# Patient Record
Sex: MALE | Race: WHITE | NOT HISPANIC OR LATINO | Employment: OTHER | URBAN - METROPOLITAN AREA
[De-identification: names, ages, dates, MRNs, and addresses within clinical notes are randomized per-mention and may not be internally consistent; named-entity substitution may affect disease eponyms.]

---

## 2018-03-10 ENCOUNTER — OFFICE VISIT (OUTPATIENT)
Dept: URGENT CARE | Facility: CLINIC | Age: 73
End: 2018-03-10
Payer: MEDICARE

## 2018-03-10 VITALS
DIASTOLIC BLOOD PRESSURE: 83 MMHG | HEIGHT: 70 IN | OXYGEN SATURATION: 97 % | SYSTOLIC BLOOD PRESSURE: 151 MMHG | HEART RATE: 78 BPM | TEMPERATURE: 99 F | RESPIRATION RATE: 16 BRPM | BODY MASS INDEX: 38.65 KG/M2 | WEIGHT: 270 LBS

## 2018-03-10 DIAGNOSIS — J30.2 ACUTE SEASONAL ALLERGIC RHINITIS, UNSPECIFIED TRIGGER: Primary | ICD-10-CM

## 2018-03-10 PROCEDURE — 99202 OFFICE O/P NEW SF 15 MIN: CPT | Mod: 25,S$GLB,, | Performed by: FAMILY MEDICINE

## 2018-03-10 PROCEDURE — 96372 THER/PROPH/DIAG INJ SC/IM: CPT | Mod: S$GLB,,, | Performed by: FAMILY MEDICINE

## 2018-03-10 RX ORDER — AZITHROMYCIN 250 MG/1
TABLET, FILM COATED ORAL
Qty: 6 TABLET | Refills: 0 | Status: SHIPPED | OUTPATIENT
Start: 2018-03-10 | End: 2018-03-15

## 2018-03-10 RX ORDER — BETAMETHASONE SODIUM PHOSPHATE AND BETAMETHASONE ACETATE 3; 3 MG/ML; MG/ML
9 INJECTION, SUSPENSION INTRA-ARTICULAR; INTRALESIONAL; INTRAMUSCULAR; SOFT TISSUE
Status: COMPLETED | OUTPATIENT
Start: 2018-03-10 | End: 2018-03-10

## 2018-03-10 RX ADMIN — BETAMETHASONE SODIUM PHOSPHATE AND BETAMETHASONE ACETATE 9 MG: 3; 3 INJECTION, SUSPENSION INTRA-ARTICULAR; INTRALESIONAL; INTRAMUSCULAR; SOFT TISSUE at 05:03

## 2018-03-10 NOTE — PROGRESS NOTES
"Subjective:       Patient ID: Ramirez Cam is a 73 y.o. male.    Vitals:  height is 5' 10" (1.778 m) and weight is 122.5 kg (270 lb). His oral temperature is 98.5 °F (36.9 °C). His blood pressure is 151/83 (abnormal) and his pulse is 78. His respiration is 16 and oxygen saturation is 97%.     Chief Complaint: Nasal Congestion; Sinus Problem; and Cough    Sinus Problem   This is a new problem. The current episode started in the past 7 days. The problem has been gradually worsening since onset. There has been no fever. He is experiencing no pain. Associated symptoms include congestion and coughing. Pertinent negatives include no chills, ear pain, headaches, hoarse voice, shortness of breath or sore throat. Past treatments include nothing. The treatment provided no relief.   Cough   This is a new problem. The current episode started in the past 7 days. The problem has been gradually worsening. The problem occurs every few minutes. The cough is productive of sputum. Pertinent negatives include no chest pain, chills, ear pain, eye redness, fever, headaches, myalgias, sore throat, shortness of breath or wheezing. Nothing aggravates the symptoms. He has tried nothing for the symptoms. The treatment provided no relief.     Review of Systems   Constitution: Negative for chills, fever and malaise/fatigue.   HENT: Positive for congestion. Negative for ear pain, hoarse voice and sore throat.    Eyes: Negative for discharge and redness.   Cardiovascular: Negative for chest pain, dyspnea on exertion and leg swelling.   Respiratory: Positive for cough. Negative for shortness of breath, sputum production and wheezing.    Musculoskeletal: Negative for myalgias.   Gastrointestinal: Negative for abdominal pain and nausea.   Neurological: Negative for headaches.       Objective:      Physical Exam   Constitutional: He appears well-developed and well-nourished.   HENT:   Head: Normocephalic and atraumatic.   Mouth/Throat: Oropharynx is " clear and moist. No oropharyngeal exudate.   Eyes: EOM are normal. Pupils are equal, round, and reactive to light.   Neck: Normal range of motion. Neck supple.   Cardiovascular: Normal rate, regular rhythm and normal heart sounds.    Pulmonary/Chest: Effort normal. He has wheezes (scant exp).   Nursing note and vitals reviewed.      Assessment:       1. Acute seasonal allergic rhinitis, unspecified trigger        Plan:         Acute seasonal allergic rhinitis, unspecified trigger  -     POCT Influenza A/B  -     betamethasone acetate-betamethasone sodium phosphate injection 9 mg; Inject 1.5 mLs (9 mg total) into the muscle one time.    Other orders  -     azithromycin (Z-NINA) 250 MG tablet; Take 2 tablets by mouth on day 1; Take 1 tablet by mouth on days 2-5  Dispense: 6 tablet; Refill: 0      To start antibiotics should sputum production become worse and feeling of shortness of breath

## 2018-03-10 NOTE — PATIENT INSTRUCTIONS

## 2021-02-21 ENCOUNTER — OFFICE VISIT (OUTPATIENT)
Dept: URGENT CARE | Facility: CLINIC | Age: 76
End: 2021-02-21
Payer: MEDICARE

## 2021-02-21 VITALS
OXYGEN SATURATION: 97 % | BODY MASS INDEX: 38.65 KG/M2 | SYSTOLIC BLOOD PRESSURE: 179 MMHG | HEIGHT: 70 IN | DIASTOLIC BLOOD PRESSURE: 93 MMHG | RESPIRATION RATE: 16 BRPM | TEMPERATURE: 99 F | WEIGHT: 270 LBS | HEART RATE: 61 BPM

## 2021-02-21 DIAGNOSIS — M54.41 ACUTE RIGHT-SIDED LOW BACK PAIN WITH RIGHT-SIDED SCIATICA: Primary | ICD-10-CM

## 2021-02-21 DIAGNOSIS — R81 GLUCOSURIA: ICD-10-CM

## 2021-02-21 LAB
BILIRUB UR QL STRIP: NEGATIVE
GLUCOSE SERPL-MCNC: 169 MG/DL (ref 70–110)
GLUCOSE UR QL STRIP: POSITIVE
KETONES UR QL STRIP: NEGATIVE
LEUKOCYTE ESTERASE UR QL STRIP: NEGATIVE
PH, POC UA: 5 (ref 5–8)
POC BLOOD, URINE: NEGATIVE
POC NITRATES, URINE: NEGATIVE
PROT UR QL STRIP: NEGATIVE
SP GR UR STRIP: 1.01 (ref 1–1.03)
UROBILINOGEN UR STRIP-ACNC: NORMAL (ref 0.3–2.2)

## 2021-02-21 PROCEDURE — 82962 GLUCOSE BLOOD TEST: CPT | Mod: S$GLB,,, | Performed by: PHYSICIAN ASSISTANT

## 2021-02-21 PROCEDURE — 99214 OFFICE O/P EST MOD 30 MIN: CPT | Mod: 25,S$GLB,, | Performed by: PHYSICIAN ASSISTANT

## 2021-02-21 PROCEDURE — 81003 URINALYSIS AUTO W/O SCOPE: CPT | Mod: QW,S$GLB,, | Performed by: PHYSICIAN ASSISTANT

## 2021-02-21 PROCEDURE — 81003 POCT URINALYSIS, DIPSTICK, AUTOMATED, W/O SCOPE: ICD-10-PCS | Mod: QW,S$GLB,, | Performed by: PHYSICIAN ASSISTANT

## 2021-02-21 PROCEDURE — 82962 POCT GLUCOSE, HAND-HELD DEVICE: ICD-10-PCS | Mod: S$GLB,,, | Performed by: PHYSICIAN ASSISTANT

## 2021-02-21 PROCEDURE — 99214 PR OFFICE/OUTPT VISIT, EST, LEVL IV, 30-39 MIN: ICD-10-PCS | Mod: 25,S$GLB,, | Performed by: PHYSICIAN ASSISTANT

## 2021-02-21 RX ORDER — METFORMIN HYDROCHLORIDE 1000 MG/1
TABLET ORAL
COMMUNITY
Start: 2020-12-23

## 2021-02-21 RX ORDER — WARFARIN 2.5 MG/1
TABLET ORAL
COMMUNITY
Start: 2021-02-04

## 2021-02-21 RX ORDER — INDAPAMIDE 1.25 MG/1
1.25 TABLET ORAL DAILY
COMMUNITY
Start: 2020-12-11

## 2021-02-21 RX ORDER — ALLOPURINOL 300 MG/1
300 TABLET ORAL DAILY
COMMUNITY
Start: 2020-12-20

## 2021-02-21 RX ORDER — FOLIC ACID 1 MG/1
1000 TABLET ORAL DAILY
COMMUNITY
Start: 2021-02-18

## 2021-02-21 RX ORDER — LIDOCAINE 50 MG/G
1 PATCH TOPICAL DAILY PRN
Qty: 30 PATCH | Refills: 0 | Status: SHIPPED | OUTPATIENT
Start: 2021-02-21

## 2021-02-21 RX ORDER — MESALAMINE 1.2 G/1
TABLET, DELAYED RELEASE ORAL
COMMUNITY
Start: 2020-08-31

## 2021-02-21 RX ORDER — TIMOLOL MALEATE 2.5 MG/ML
SOLUTION/ DROPS OPHTHALMIC
COMMUNITY
Start: 2021-02-19

## 2021-02-21 RX ORDER — LATANOPROST 50 UG/ML
SOLUTION/ DROPS OPHTHALMIC
COMMUNITY
Start: 2020-12-11

## 2021-02-21 RX ORDER — GABAPENTIN 400 MG/1
CAPSULE ORAL
COMMUNITY
Start: 2020-08-31

## 2021-02-21 RX ORDER — LOSARTAN POTASSIUM 100 MG/1
TABLET ORAL
COMMUNITY
Start: 2021-01-28

## 2021-02-21 RX ORDER — ROSUVASTATIN CALCIUM 10 MG/1
TABLET, COATED ORAL
COMMUNITY
Start: 2020-08-31

## 2021-02-21 RX ORDER — CARVEDILOL 6.25 MG/1
TABLET ORAL
COMMUNITY
Start: 2021-01-28

## 2021-02-21 RX ORDER — MONTELUKAST SODIUM 4 MG/1
TABLET, CHEWABLE ORAL
COMMUNITY
Start: 2020-08-31

## 2021-02-24 LAB
BACTERIA UR CULT: NO GROWTH
BACTERIA UR CULT: NORMAL

## 2021-02-26 ENCOUNTER — TELEPHONE (OUTPATIENT)
Dept: URGENT CARE | Facility: CLINIC | Age: 76
End: 2021-02-26

## 2024-09-20 ENCOUNTER — OFFICE VISIT (OUTPATIENT)
Dept: WOUND CARE | Facility: HOSPITAL | Age: 79
End: 2024-09-20
Attending: NURSE PRACTITIONER
Payer: MEDICARE

## 2024-09-20 VITALS
RESPIRATION RATE: 17 BRPM | DIASTOLIC BLOOD PRESSURE: 87 MMHG | SYSTOLIC BLOOD PRESSURE: 156 MMHG | HEART RATE: 77 BPM | TEMPERATURE: 99 F

## 2024-09-20 DIAGNOSIS — T81.33XA DISRUPTION OF TRAUMATIC INJURY WOUND REPAIR, INITIAL ENCOUNTER: Primary | ICD-10-CM

## 2024-09-20 PROCEDURE — 11042 DBRDMT SUBQ TIS 1ST 20SQCM/<: CPT

## 2024-09-20 PROCEDURE — 99214 OFFICE O/P EST MOD 30 MIN: CPT

## 2024-09-20 NOTE — PROGRESS NOTES
[facility]  Wound Care  Progress Note    Problem List Items Addressed This Visit          Orthopedic    Disruption of traumatic injury wound repair - Primary    Overview     HPI: 79 yr old male with history of laceration to right leg requiring 17 sutures, lower portion dehisced  and was referred to the wound clinic, he initially went to an Urgent care and was prescribed bactroban.    Location: right anterior lower leg    Duration: 8-11-24    Context: dehiscence of traumatic laceration     Associated Signs & Symptoms: denies pain    Timing: n/a    Severity: n/a    Quality: n/a    Modifying Factors: n/a            Physical Exam  Constitutional:       Appearance: Normal appearance.   HENT:      Head: Normocephalic.   Pulmonary:      Effort: Pulmonary effort is normal.   Musculoskeletal:         General: Normal range of motion.   Skin:     General: Skin is warm and dry.      Comments: Laceration to right lower leg that dehisced with adipose exposed.   Neurological:      Mental Status: He is alert and oriented to person, place, and time.   Psychiatric:         Mood and Affect: Mood normal.         Behavior: Behavior normal.         Thought Content: Thought content normal.         Judgment: Judgment normal.     Wound debrided, will use silver alginate qod, keep clean and dry and covered at all times, spandagrip stocking for edema management, will recheck in one week, his potential to heal is good.  See wound doc progress notes. Documents will be scanned.        Matilde King  [facility]  Progress Note    Problem List Items Addressed This Visit          Orthopedic    Disruption of traumatic injury wound repair - Primary    Overview     HPI: 79 yr old male with history of laceration to right leg requiring 17 sutures, lower portion dehisced  and was referred to the wound clinic, he initially went to an Urgent care and was prescribed  bactroban.    Location: right anterior lower leg    Duration: 8-11-24    Context: dehiscence of traumatic laceration     Associated Signs & Symptoms: denies pain    Timing: n/a    Severity: n/a    Quality: n/a    Modifying Factors: n/a              See wound doc progress notes. Documents will be scanned.        [facility]ilde DicksonBoeuf [facility]

## 2024-09-27 ENCOUNTER — OFFICE VISIT (OUTPATIENT)
Dept: WOUND CARE | Facility: HOSPITAL | Age: 79
End: 2024-09-27
Attending: NURSE PRACTITIONER
Payer: MEDICARE

## 2024-09-27 VITALS
DIASTOLIC BLOOD PRESSURE: 83 MMHG | SYSTOLIC BLOOD PRESSURE: 141 MMHG | TEMPERATURE: 99 F | RESPIRATION RATE: 18 BRPM | HEART RATE: 74 BPM

## 2024-09-27 DIAGNOSIS — T81.33XA DISRUPTION OF TRAUMATIC INJURY WOUND REPAIR, INITIAL ENCOUNTER: Primary | ICD-10-CM

## 2024-09-27 PROCEDURE — 11042 DBRDMT SUBQ TIS 1ST 20SQCM/<: CPT

## 2024-09-27 NOTE — PROGRESS NOTES
[facility]  Wound Care  Progress Note    Problem List Items Addressed This Visit          Orthopedic    Disruption of traumatic injury wound repair - Primary    Overview     HPI: 79 yr old male with history of laceration to right leg requiring 17 sutures, lower portion dehisced  and was referred to the wound clinic, he initially went to an Urgent care and was prescribed bactroban.    Location: right anterior lower leg    Duration: 8-11-24    Context: dehiscence of traumatic laceration     Associated Signs & Symptoms: denies pain    Timing: n/a    Severity: n/a    Quality: n/a    Modifying Factors: n/a    9-27-24: here for wound care for laceration to right leg          Physical Exam  Vitals reviewed.   Constitutional:       Appearance: Normal appearance.   HENT:      Head: Normocephalic.   Pulmonary:      Effort: Pulmonary effort is normal.   Musculoskeletal:         General: Normal range of motion.   Skin:     General: Skin is warm and dry.      Comments: Laceration to right lower leg that dehisced with adipose exposed.    Neurological:      General: No focal deficit present.      Mental Status: He is alert and oriented to person, place, and time.   Psychiatric:         Mood and Affect: Mood normal.         Behavior: Behavior normal.         Thought Content: Thought content normal.         Judgment: Judgment normal.     Wound debrided, will use silver alginate qod, keep clean and dry and covered at all times, spandagrip stocking for edema management, will recheck in one week, his potential to heal is good.   See wound doc progress notes. Documents will be scanned.        Matilde King  [facility]

## 2024-10-04 ENCOUNTER — OFFICE VISIT (OUTPATIENT)
Dept: WOUND CARE | Facility: HOSPITAL | Age: 79
End: 2024-10-04
Attending: NURSE PRACTITIONER
Payer: MEDICARE

## 2024-10-04 VITALS
DIASTOLIC BLOOD PRESSURE: 76 MMHG | HEART RATE: 76 BPM | SYSTOLIC BLOOD PRESSURE: 132 MMHG | TEMPERATURE: 98 F | RESPIRATION RATE: 18 BRPM

## 2024-10-04 DIAGNOSIS — T81.33XA DISRUPTION OF TRAUMATIC INJURY WOUND REPAIR, INITIAL ENCOUNTER: Primary | ICD-10-CM

## 2024-10-04 PROCEDURE — 11042 DBRDMT SUBQ TIS 1ST 20SQCM/<: CPT

## 2024-10-04 NOTE — PROGRESS NOTES
[facility]  Wound Care  Progress Note    Problem List Items Addressed This Visit          Orthopedic    Disruption of traumatic injury wound repair - Primary    Overview     HPI: 79 yr old male with history of laceration to right leg requiring 17 sutures, lower portion dehisced  and was referred to the wound clinic, he initially went to an Urgent care and was prescribed bactroban.    Location: right anterior lower leg    Duration: 8-11-24    Context: dehiscence of traumatic laceration     Associated Signs & Symptoms: denies pain    Timing: n/a    Severity: n/a    Quality: n/a    Modifying Factors: n/a    9-27-24: here for wound care for laceration to right leg        Physical Exam  Vitals reviewed.   Constitutional:       Appearance: Normal appearance.   Pulmonary:      Effort: Pulmonary effort is normal.   Musculoskeletal:         General: Normal range of motion.   Skin:     General: Skin is warm and dry.      Comments: Laceration to right lower leg that dehisced with adipose exposed   Neurological:      Mental Status: He is alert and oriented to person, place, and time.   Psychiatric:         Mood and Affect: Mood normal.         Behavior: Behavior normal.         Thought Content: Thought content normal.         Judgment: Judgment normal.     Wound debrided, will use silver alginate qod, keep clean and dry and covered at all times, spandagrip stocking for edema management, will recheck in one week, his potential to heal is good.       See wound doc progress notes. Documents will be scanned.        Matilde King  [facility]

## 2024-10-18 ENCOUNTER — OFFICE VISIT (OUTPATIENT)
Dept: WOUND CARE | Facility: HOSPITAL | Age: 79
End: 2024-10-18
Attending: NURSE PRACTITIONER
Payer: MEDICARE

## 2024-10-18 VITALS
DIASTOLIC BLOOD PRESSURE: 78 MMHG | SYSTOLIC BLOOD PRESSURE: 126 MMHG | RESPIRATION RATE: 18 BRPM | HEART RATE: 73 BPM | TEMPERATURE: 98 F

## 2024-10-18 DIAGNOSIS — T81.33XA DISRUPTION OF TRAUMATIC INJURY WOUND REPAIR, INITIAL ENCOUNTER: Primary | ICD-10-CM

## 2024-10-18 PROCEDURE — 11042 DBRDMT SUBQ TIS 1ST 20SQCM/<: CPT

## 2024-10-18 NOTE — PROGRESS NOTES
[facility]  Wound Care  Progress Note    Problem List Items Addressed This Visit          Orthopedic    Disruption of traumatic injury wound repair - Primary    Overview     HPI: 79 yr old male with history of laceration to right leg requiring 17 sutures, lower portion dehisced  and was referred to the wound clinic, he initially went to an Urgent care and was prescribed bactroban.    Location: right anterior lower leg    Duration: 8-11-24    Context: dehiscence of traumatic laceration     Associated Signs & Symptoms: denies pain    Timing: n/a    Severity: n/a    Quality: n/a    Modifying Factors: n/a    9-27-24: here for wound care for laceration to right leg          Physical Exam  Constitutional:       Appearance: Normal appearance.   HENT:      Head: Normocephalic.   Pulmonary:      Effort: Pulmonary effort is normal.   Musculoskeletal:         General: Normal range of motion.   Skin:     General: Skin is warm.      Comments: Laceration to right lower leg that dehisced with adipose exposed    Neurological:      Mental Status: He is alert and oriented to person, place, and time.   Psychiatric:         Mood and Affect: Mood normal.         Behavior: Behavior normal.         Thought Content: Thought content normal.         Judgment: Judgment normal.     Wound debrided, doing well and much smaller in size. will use silver alginate qod, keep clean and dry and covered at all times, spandagrip stocking for edema management, will recheck in one week, his potential to heal is good.     See wound doc progress notes. Documents will be scanned.        Matilde King  [facility]

## 2024-10-25 ENCOUNTER — OFFICE VISIT (OUTPATIENT)
Dept: WOUND CARE | Facility: HOSPITAL | Age: 79
End: 2024-10-25
Attending: NURSE PRACTITIONER
Payer: MEDICARE

## 2024-10-25 VITALS
DIASTOLIC BLOOD PRESSURE: 83 MMHG | HEART RATE: 78 BPM | SYSTOLIC BLOOD PRESSURE: 124 MMHG | RESPIRATION RATE: 18 BRPM | TEMPERATURE: 99 F

## 2024-10-25 DIAGNOSIS — T81.33XA DISRUPTION OF TRAUMATIC INJURY WOUND REPAIR, INITIAL ENCOUNTER: Primary | ICD-10-CM

## 2024-10-25 PROCEDURE — 11042 DBRDMT SUBQ TIS 1ST 20SQCM/<: CPT

## 2024-10-25 NOTE — PROGRESS NOTES
[facility]  Wound Care  Progress Note    Problem List Items Addressed This Visit          Orthopedic    Disruption of traumatic injury wound repair - Primary    Overview     HPI: 79 yr old male with history of laceration to right leg requiring 17 sutures, lower portion dehisced  and was referred to the wound clinic, he initially went to an Urgent care and was prescribed bactroban.    Location: right anterior lower leg    Duration: 8-11-24    Context: dehiscence of traumatic laceration     Associated Signs & Symptoms: denies pain    Timing: n/a    Severity: n/a    Quality: n/a    Modifying Factors: n/a    9-27-24: here for wound care for laceration to right leg          Physical Exam  Constitutional:       Appearance: Normal appearance.   HENT:      Head: Normocephalic.   Pulmonary:      Effort: Pulmonary effort is normal.   Musculoskeletal:         General: Normal range of motion.   Skin:     General: Skin is warm and dry.      Comments:  Laceration to right lower leg that dehisced with adipose exposed   Neurological:      Mental Status: He is alert and oriented to person, place, and time.   Psychiatric:         Mood and Affect: Mood normal.         Behavior: Behavior normal.         Thought Content: Thought content normal.         Judgment: Judgment normal.       Wound debrided, doing well and much smaller in size. will change to magui qod, keep clean and dry and covered at all times, spandagrip stocking for edema management, will recheck in one week, his potential to heal is good.     See wound doc progress notes. Documents will be scanned.    Matilde King  [facility]

## 2024-11-01 ENCOUNTER — OFFICE VISIT (OUTPATIENT)
Dept: WOUND CARE | Facility: HOSPITAL | Age: 79
End: 2024-11-01
Attending: NURSE PRACTITIONER
Payer: MEDICARE

## 2024-11-01 VITALS
TEMPERATURE: 99 F | DIASTOLIC BLOOD PRESSURE: 78 MMHG | RESPIRATION RATE: 18 BRPM | SYSTOLIC BLOOD PRESSURE: 121 MMHG | HEART RATE: 77 BPM

## 2024-11-01 DIAGNOSIS — T81.33XA DISRUPTION OF TRAUMATIC INJURY WOUND REPAIR, INITIAL ENCOUNTER: Primary | ICD-10-CM

## 2024-11-01 PROCEDURE — 11042 DBRDMT SUBQ TIS 1ST 20SQCM/<: CPT

## 2024-11-08 ENCOUNTER — OFFICE VISIT (OUTPATIENT)
Dept: WOUND CARE | Facility: HOSPITAL | Age: 79
End: 2024-11-08
Attending: NURSE PRACTITIONER
Payer: MEDICARE

## 2024-11-08 VITALS
HEART RATE: 83 BPM | TEMPERATURE: 98 F | RESPIRATION RATE: 18 BRPM | SYSTOLIC BLOOD PRESSURE: 123 MMHG | DIASTOLIC BLOOD PRESSURE: 77 MMHG

## 2024-11-08 DIAGNOSIS — T81.33XA DISRUPTION OF TRAUMATIC INJURY WOUND REPAIR, INITIAL ENCOUNTER: Primary | ICD-10-CM

## 2024-11-08 PROCEDURE — 99213 OFFICE O/P EST LOW 20 MIN: CPT

## 2024-11-08 NOTE — PROGRESS NOTES
[facility]  Wound Care  Progress Note    Problem List Items Addressed This Visit          Orthopedic    Disruption of traumatic injury wound repair - Primary    Overview     HPI: 79 yr old male with history of laceration to right leg requiring 17 sutures, lower portion dehisced  and was referred to the wound clinic, he initially went to an Urgent care and was prescribed bactroban.    Location: right anterior lower leg    Duration: 8-11-24    Context: dehiscence of traumatic laceration     Associated Signs & Symptoms: denies pain    Timing: n/a    Severity: n/a    Quality: n/a    Modifying Factors: n/a    9-27-24: here for wound care for laceration to right leg  11-8-24: here for wound care for laceration to right lower leg          Physical Exam  Constitutional:       Appearance: Normal appearance.   HENT:      Head: Normocephalic.   Pulmonary:      Effort: Pulmonary effort is normal.   Musculoskeletal:         General: Normal range of motion.   Skin:     General: Skin is warm and dry.      Comments: Laceration to right lower leg is healed today   Neurological:      General: No focal deficit present.      Mental Status: He is alert and oriented to person, place, and time.   Psychiatric:         Mood and Affect: Mood normal.         Behavior: Behavior normal.         Thought Content: Thought content normal.         Judgment: Judgment normal.     See wound doc progress notes. Documents will be scanned.        Matilde King  [facility]  Progress Note    Problem List Items Addressed This Visit          Orthopedic    Disruption of traumatic injury wound repair - Primary    Overview     HPI: 79 yr old male with history of laceration to right leg requiring 17 sutures, lower portion dehisced  and was referred to the wound clinic, he initially went to an Urgent care and was prescribed bactroban.    Location: right anterior lower  leg    Duration: 8-11-24    Context: dehiscence of traumatic laceration     Associated Signs & Symptoms: denies pain    Timing: n/a    Severity: n/a    Quality: n/a    Modifying Factors: n/a    9-27-24: here for wound care for laceration to right leg          Laceration is healed, continue to avoid pressure or friction to area, call with questions or concerns.  See wound doc progress notes. Documents will be scanned.        [facility]     No